# Patient Record
Sex: FEMALE | Race: WHITE | NOT HISPANIC OR LATINO | URBAN - METROPOLITAN AREA
[De-identification: names, ages, dates, MRNs, and addresses within clinical notes are randomized per-mention and may not be internally consistent; named-entity substitution may affect disease eponyms.]

---

## 2023-02-06 ENCOUNTER — TELEPHONE (OUTPATIENT)
Dept: PODIATRY | Facility: CLINIC | Age: 33
End: 2023-02-06

## 2023-02-06 NOTE — TELEPHONE ENCOUNTER
Patient asked if Dr. Messer do laser or freeze the warts.     Staff informed patient Dr. Messer does not. Staff also so stated she would have top reach out to other podiatry clinics if that's the treat you wanted.    Patient verbalized understanding.      ----- Message from Feliz Logan sent at 2/6/2023  3:18 PM CST -----  Contact: pt at [phone]  Type:  Sooner Appointment Request    Caller is requesting a sooner appointment.  Caller declined first available appointment listed below.  Caller will not accept being placed on the waitlist and is requesting a message be sent to doctor.    Name of Caller:  pt  When is the first available appointment?  N/A  Symptoms:  wart on bottom of right foot  Best Call Back Number:  [phone]  Additional Information:  pt is calling the office to schedule an appt due to her having a wart on bottom of right foot but none come up. Please call back and advise.